# Patient Record
Sex: MALE | URBAN - METROPOLITAN AREA
[De-identification: names, ages, dates, MRNs, and addresses within clinical notes are randomized per-mention and may not be internally consistent; named-entity substitution may affect disease eponyms.]

---

## 2023-08-25 ENCOUNTER — LAB OUTSIDE AN ENCOUNTER (OUTPATIENT)
Dept: URBAN - METROPOLITAN AREA CLINIC 27 | Facility: CLINIC | Age: 33
End: 2023-08-25

## 2023-08-25 ENCOUNTER — OFFICE VISIT (OUTPATIENT)
Dept: URBAN - METROPOLITAN AREA CLINIC 27 | Facility: CLINIC | Age: 33
End: 2023-08-25
Payer: COMMERCIAL

## 2023-08-25 ENCOUNTER — DASHBOARD ENCOUNTERS (OUTPATIENT)
Age: 33
End: 2023-08-25

## 2023-08-25 VITALS
BODY MASS INDEX: 29.13 KG/M2 | DIASTOLIC BLOOD PRESSURE: 74 MMHG | HEIGHT: 74 IN | HEART RATE: 60 BPM | WEIGHT: 227 LBS | SYSTOLIC BLOOD PRESSURE: 130 MMHG

## 2023-08-25 DIAGNOSIS — K92.1 HEMATOCHEZIA: ICD-10-CM

## 2023-08-25 PROBLEM — 449341000124102: Status: ACTIVE | Noted: 2023-08-25

## 2023-08-25 PROCEDURE — 99203 OFFICE O/P NEW LOW 30 MIN: CPT | Performed by: INTERNAL MEDICINE

## 2023-08-25 RX ORDER — SOD SULF/POT CHLORIDE/MAG SULF 1.479 G
12 TABLETS THE FIRST DOSE THE EVENING BEFORE AND SECOND DOSE THE MORNING OF COLONOSCOPY TABLET ORAL TWICE A DAY
Qty: 24 TABLET | OUTPATIENT
Start: 2023-08-25 | End: 2023-08-26

## 2023-08-25 NOTE — HPI-TODAY'S VISIT:
Mr. Magallanes is a 30-year-old -American male who presents today per referral from Dr. Chaparrita Bedolla for evaluation of rectal bleeding.  He reports that he has been experiencing intermittent rectal bleeding over the past 6 months.  He occasionally has rectal pain.  He actually denies constipation.  His family history is significant for his brother and sister both having colon polyps removed in her 30s.  He denies any family history of colon cancer.  He has never had a colonoscopy.  He denies any other gastrointestinal or medical problems.

## 2023-09-06 ENCOUNTER — CLAIMS CREATED FROM THE CLAIM WINDOW (OUTPATIENT)
Dept: URBAN - METROPOLITAN AREA CLINIC 4 | Facility: CLINIC | Age: 33
End: 2023-09-06
Payer: COMMERCIAL

## 2023-09-06 ENCOUNTER — OFFICE VISIT (OUTPATIENT)
Dept: URBAN - METROPOLITAN AREA SURGERY CENTER 7 | Facility: SURGERY CENTER | Age: 33
End: 2023-09-06
Payer: COMMERCIAL

## 2023-09-06 ENCOUNTER — WEB ENCOUNTER (OUTPATIENT)
Dept: URBAN - METROPOLITAN AREA CLINIC 27 | Facility: CLINIC | Age: 33
End: 2023-09-06

## 2023-09-06 DIAGNOSIS — K92.1 HEMATOCHEZIA: ICD-10-CM

## 2023-09-06 DIAGNOSIS — K62.5 ANAL BLEEDING: ICD-10-CM

## 2023-09-06 DIAGNOSIS — K63.89 OTHER SPECIFIED DISEASES OF INTESTINE: ICD-10-CM

## 2023-09-06 DIAGNOSIS — D12.8 ADENOMATOUS POLYP OF RECTUM: ICD-10-CM

## 2023-09-06 DIAGNOSIS — K63.5 BENIGN COLON POLYP: ICD-10-CM

## 2023-09-06 DIAGNOSIS — K64.0 INTERNAL HEMORRHOIDS GRADE I: ICD-10-CM

## 2023-09-06 PROCEDURE — G8907 PT DOC NO EVENTS ON DISCHARG: HCPCS | Performed by: INTERNAL MEDICINE

## 2023-09-06 PROCEDURE — 88305 TISSUE EXAM BY PATHOLOGIST: CPT | Performed by: PATHOLOGY

## 2023-09-06 PROCEDURE — 00811 ANES LWR INTST NDSC NOS: CPT | Performed by: NURSE ANESTHETIST, CERTIFIED REGISTERED

## 2023-09-06 PROCEDURE — 45380 COLONOSCOPY AND BIOPSY: CPT | Performed by: INTERNAL MEDICINE

## 2023-09-11 ENCOUNTER — TELEPHONE ENCOUNTER (OUTPATIENT)
Dept: URBAN - METROPOLITAN AREA CLINIC 27 | Facility: CLINIC | Age: 33
End: 2023-09-11

## 2023-09-13 ENCOUNTER — CLAIMS CREATED FROM THE CLAIM WINDOW (OUTPATIENT)
Dept: URBAN - METROPOLITAN AREA TELEHEALTH 2 | Facility: TELEHEALTH | Age: 33
End: 2023-09-13
Payer: COMMERCIAL

## 2023-09-13 DIAGNOSIS — R10.9 LEFT SIDED ABDOMINAL PAIN: ICD-10-CM

## 2023-09-13 PROBLEM — 285387005: Status: ACTIVE | Noted: 2023-09-13

## 2023-09-13 PROCEDURE — 99443 PHONE E/M BY PHYS 21-30 MIN: CPT | Performed by: INTERNAL MEDICINE

## 2023-09-13 NOTE — HPI-TODAY'S VISIT:
Mr. Magallanes calls in today with complaints of abdominal pain.  He reports that he has been experiencing left-sided flank abdominal discomfort which developed 1 day after his colonoscopy exam last week.  His exam was unremarkable, except for diminutive polyp which was removed from his rectum.  He also reports 2 episodes of dysuria.  His abdominal discomfort has not improved.  I advised him to go to the emergency room when he complained of this earlier this week.  He also reports subjective fever.  Otherwise, he has no other GI or medical complaints.